# Patient Record
Sex: MALE | Race: WHITE | NOT HISPANIC OR LATINO | Employment: OTHER | URBAN - METROPOLITAN AREA
[De-identification: names, ages, dates, MRNs, and addresses within clinical notes are randomized per-mention and may not be internally consistent; named-entity substitution may affect disease eponyms.]

---

## 2021-05-25 ENCOUNTER — RECORDS - HEALTHEAST (OUTPATIENT)
Dept: ADMINISTRATIVE | Facility: CLINIC | Age: 76
End: 2021-05-25

## 2023-09-19 ENCOUNTER — OFFICE VISIT (OUTPATIENT)
Dept: FAMILY MEDICINE | Facility: CLINIC | Age: 78
End: 2023-09-19
Payer: COMMERCIAL

## 2023-09-19 VITALS
DIASTOLIC BLOOD PRESSURE: 68 MMHG | HEIGHT: 76 IN | HEART RATE: 50 BPM | SYSTOLIC BLOOD PRESSURE: 164 MMHG | WEIGHT: 245 LBS | TEMPERATURE: 98.1 F | OXYGEN SATURATION: 98 % | RESPIRATION RATE: 16 BRPM | BODY MASS INDEX: 29.83 KG/M2

## 2023-09-19 DIAGNOSIS — G62.9 NEUROPATHY: Primary | ICD-10-CM

## 2023-09-19 PROCEDURE — 99203 OFFICE O/P NEW LOW 30 MIN: CPT

## 2023-09-19 RX ORDER — SILDENAFIL CITRATE 20 MG/1
TABLET ORAL
COMMUNITY
Start: 2022-06-29

## 2023-09-19 RX ORDER — WARFARIN SODIUM 5 MG/1
7.5 TABLET ORAL DAILY
COMMUNITY
Start: 2022-06-27

## 2023-09-19 RX ORDER — SIMVASTATIN 20 MG
20 TABLET ORAL
COMMUNITY
Start: 2022-06-27

## 2023-09-19 RX ORDER — CHLORAL HYDRATE 500 MG
CAPSULE ORAL
COMMUNITY

## 2023-09-19 RX ORDER — CELECOXIB 200 MG/1
200 CAPSULE ORAL
COMMUNITY
Start: 2022-08-29

## 2023-09-19 RX ORDER — ASPIRIN 81 MG/1
81 TABLET ORAL DAILY
COMMUNITY
Start: 2022-08-29

## 2023-09-19 RX ORDER — SENNOSIDES 8.6 MG
1300 CAPSULE ORAL
COMMUNITY

## 2023-09-19 NOTE — PROGRESS NOTES
Assessment & Plan   Problem List Items Addressed This Visit          Nervous and Auditory    Neuropathy - Primary     Unfortunately, patient was under the impression that I was a neurology provider based on his conversation with [facility]. He has already addressed this with his PCP through [facility], who referred him to neurology. He actually cancelled his appointment with neurology through [facility] after he scheduled his appointment with myself. His symptoms are consistent with a significant neuropathy. He has very minimal sensation on exam. Fortunately, he has no evidence of skin breakdown on his feet or extensive nail disease, but does have some vascular insufficiency as evidenced by venous stasis dermatitis. We reviewed that I would be more than happy to draw some supportive labs, however he really needs an EMG with neurology to confirm the suspected diagnosis and provide next steps. His blood sugars have been fine, most recently in June and no history of diabetes. We discussed medications used to treat neuropathy, primarily gabapentin/Lyrica, but he is not having any pain and is not interested in starting any medication at this time. I have placed an additional referral within the  Neurology system and also given him the name of the [facility] as another resource.          Relevant Orders    Adult Neurology [facility] Referral      NEREYDA Brooks CNP  [facility] is a 78 year old, presenting for the following health issues:  lower leg (Pain lower legs feet and toes)        9/19/2023     9:25 AM   Additional Questions   Roomed by ac   Accompanied by self       -Symptoms started early March   -He partially tore his right quadriceps at the end of February. He wore a groin to ankle soft cast for 7 weeks and had subsequent muscle atrophy and change in his mobility.   -Having burning sensations in bilateral legs  -Toes are numb, bottom of his  "feet are numb. Having difficulty with ambulation.  -Not painful   -No history of diabetes  -History of A-Fib, but no other coronary diagnoses per his report. Follows with cardiology.    History of Present Illness       Reason for visit:  Neuropathy    He eats 2-3 servings of fruits and vegetables daily.He consumes 1 sweetened beverage(s) daily.He exercises with enough effort to increase his heart rate 10 to 19 minutes per day.  He exercises with enough effort to increase his heart rate 5 days per week.   He is taking medications regularly.     Review of Systems         Objective    BP (!) 164/68 (BP Location: Left arm, Patient Position: Sitting, Cuff Size: Adult Large)   Pulse 50   Temp 98.1  F (36.7  C) (Oral)   Resp 16   Ht 1.93 m (6' 4\")   Wt 111.1 kg (245 lb)   SpO2 98%   BMI 29.82 kg/m    Body mass index is 29.82 kg/m .    Physical Exam  Vitals and nursing note reviewed.   Constitutional:       Appearance: Normal appearance.   Cardiovascular:      Pulses:           Dorsalis pedis pulses are 1+ on the right side and 1+ on the left side.   Musculoskeletal:      Comments: Fritz discoloration to bilateral lower extremities consistent with venous stasis dermatitis   Feet:      Right foot:      Skin integrity: Skin integrity normal. No ulcer, skin breakdown or fissure.      Toenail Condition: Right toenails are abnormally thick.      Left foot:      Skin integrity: Skin integrity normal. No ulcer, skin breakdown or fissure.      Toenail Condition: Left toenails are abnormally thick.      Comments: Significantly decreased sensation to bilateral feet   Neurological:      Mental Status: He is alert.                  "

## 2023-09-19 NOTE — ASSESSMENT & PLAN NOTE
Unfortunately, patient was under the impression that I was a neurology provider based on his conversation with [facility]. He has already addressed this with his PCP through [facility], who referred him to neurology. He actually cancelled his appointment with neurology through [facility] after he scheduled his appointment with myself. His symptoms are consistent with a significant neuropathy. He has very minimal sensation on exam. Fortunately, he has no evidence of skin breakdown on his feet or extensive nail disease, but does have some vascular insufficiency as evidenced by venous stasis dermatitis. We reviewed that I would be more than happy to draw some supportive labs, however he really needs an EMG with neurology to confirm the suspected diagnosis and provide next steps. His blood sugars have been fine, most recently in June and no history of diabetes. We discussed medications used to treat neuropathy, primarily gabapentin/Lyrica, but he is not having any pain and is not interested in starting any medication at this time. I have placed an additional referral within the  Neurology system and also given him the name of the [facility] as another resource.